# Patient Record
Sex: MALE | Race: WHITE | Employment: OTHER | URBAN - METROPOLITAN AREA
[De-identification: names, ages, dates, MRNs, and addresses within clinical notes are randomized per-mention and may not be internally consistent; named-entity substitution may affect disease eponyms.]

---

## 2022-11-15 ENCOUNTER — HOSPITAL ENCOUNTER (OUTPATIENT)
Age: 67
Discharge: HOME | End: 2022-11-17
Payer: OTHER GOVERNMENT

## 2022-11-15 ENCOUNTER — HOSPITAL ENCOUNTER (OUTPATIENT)
Dept: GENERAL RADIOLOGY | Age: 67
Discharge: HOME | End: 2022-11-17
Payer: OTHER GOVERNMENT

## 2022-11-15 ENCOUNTER — HOSPITAL ENCOUNTER (OUTPATIENT)
Dept: PULMONOLOGY | Age: 67
Discharge: HOME | End: 2022-11-15
Payer: OTHER GOVERNMENT

## 2022-11-15 DIAGNOSIS — J61 AMIANTHOSIS (HCC): ICD-10-CM

## 2022-11-15 DIAGNOSIS — J61 ASBESTOSIS (HCC): ICD-10-CM

## 2022-11-15 PROCEDURE — 71046 X-RAY EXAM CHEST 2 VIEWS: CPT

## 2022-11-15 PROCEDURE — 94726 PLETHYSMOGRAPHY LUNG VOLUMES: CPT

## 2022-11-15 PROCEDURE — 94060 EVALUATION OF WHEEZING: CPT

## 2022-11-15 PROCEDURE — 94729 DIFFUSING CAPACITY: CPT

## 2022-11-19 NOTE — PROCEDURES
[address] [place] [address]                               PULMONARY FUNCTION    PATIENT NAME: Serena Anne                    :        1955  MED REC NO:   [id number]                            ROOM:  ACCOUNT NO:   [de-identified]                           ADMIT DATE: 11/15/2022  PROVIDER:     Johnna Lane DO    DATE OF PROCEDURE:  11/15/2022    This is a pulmonary function interpretation by Dr. Elisha Buenrostro,  requested by Dr. Raj Mesa. INTERPRETATION OF THE PULMONARY FUNCTION STUDY    DEMOGRAPHICS:  This is a 73-year-old male and the height is 5 feet 6  inches (66 inches). His age is 69. He is  male and with his  weight of 185 pounds his BMI is 29.9, so he is borderline mildly obese. REASON FOR THE PULMONARY FUNCTION STUDY:  It is to evaluate the patient  with regards to his pulmonary symptoms and his major symptom is  exertional dyspnea. So, I would say that his pulmonary symptoms  primarily would be his exertional dyspnea. QUESTION:  Has he ever had a pulmonary function study? ANSWER:  No.    QUESTION AND ANSWER:  Has he ever had any allergies to medications and  to process this, I would say that there is none on file, so no known  drug allergies to our knowledge. CURRENT MEDICATIONS:  That would be appreciated as a high blood pressure  pill. He does not describe the pill that he is taking for his blood  pressure. QUESTION:  Was he in a chronic stable state when performing the study? ANSWER:  He states he did not have any community respiratory infections,  which would influence the interpretation of this study today. PULMONARY SYMPTOMS:  He says he does not have a cough. He does not  really notice any coughing in his history, either acutely or  chronically. I would like to state that also suggests that he does not  have any hemoptysis.   He does state that he does wheeze and he notices  his shortness of breath is primarily with activities, but also at rest  as well, so \"it is all the time. \"  So we can appreciate this as being  remarkable. CONSTITUTIONAL SYMPTOMS:  He has not really stated that he has lost any  excessive weight or noticed any fevers or night sweats. So that would  be important. SMOKING HISTORY:  He states that he smoked for 30 years, but only smoked  somewhere around a half-a-pack a day. ATOPIC HISTORY:  He denies any sensitivities. So he would be nonatopic  by history. PET EXPOSURE:  He denies any pet exposure. ENVIRONMENTAL FACTORS:  He says that he does appear to notice more  shortness of breath with the heat, the cold air or the humid air. 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 So, that would be considered asbestos-related  pleural disease. REVIEW OF SYSTEMS:  I do not appreciate any review other than for his  hypertension on a blood pressure medication. SLEEP:  I see that he does have two-pillow need at night. FAMILY HISTORY:  Did not include any family history in his subjective  history here today  and he has had no exposure to tuberculosis that we are aware of. PULMONARY FUNCTION STUDY:  SPIROMETRY:  On his forced vital capacity maneuver, he should get 3.6 L  out on the forced expiratory maneuver with his vital capacity and he  only had 2.8 L, 78%. A little bit better after the bronchodilator to 3  L and 80% of predicted. So there is some mild restrictive mechanics. Now in the 1 second of the 3.6 L, he should have 2.8 L out in 1 second. He only had 1.1 L, 40% of predicted and then he improved about 200 mL  after bronchodilator 1.37 L, 48% of predicted. So this suggests rather  severe flow impairment that is largely irreversible. There is a partial  reversible component, but largely irreversible.   The FEV1 to FVC ratio  postbronchodilator at 46% suggests that he does have airflow obstructive  disease and based on his FEV1 postbronchodilator of 1.3 L, 48% of  predicted, this is considered to be severe airflow obstructive disease  and with his smoking history this is consistent with the diagnosis of  COPD, especially if it is progressive over time. MECHANICS OF BREATHING:  We see that there is definite influence on his  diaphragmatic strength, his expiratory muscle strength, and his maximum  voluntary ventilation related to his underlying flow impairment and his  COPD. So, based on his MVV of only 37 L per minute, this would suggest  that anything that he does of a mild or sedentary nature will result in  breathlessness and it has influenced his mechanics of breathing probably  with mechanical impairment associated with his COPD. STATIC LUNG VOLUMES:  We could see that there is air trapping with his  RV to TLC being twice normal.  His total lung capacity is at 8 L and  suggesting hyperinflation. So we can see that in addition to the  hyperinflation and air trapping, we see that his inspiratory capacity is  reduced as well as a consequence of his hyperinflated lungs. Gas  transfer is reduced as well and this would suggest that he probably has  a component of centrilobular emphysema associated with his COPD. Now  with small airway changes, we could see that there is airway dystonia  with his airway resistance being markedly increased as his conductance  decreased. FLOW VOLUME CURVE:  He has an obstructive pattern and there is a subtle  improvement after bronchodilator and with less air trapping noted.     IMPRESSION:  The patient's pulmonary function demonstrates evidence that  his former smoking history has predisposed him to a pulmonary  complication that would be consistent with COPD GOLD III, which is  severe pulmonary functional impairment and there is a phenotype that is  suggestive of centrilobular emphysema with hyperinflation and air  trapping and severe gas transfer impairment and this is important. I do  find that he has pleural plaques suggesting asbestos-related pleural  changes from his asbestos exposure. I do not see a lot of changes that  would suggest that his gas transfer impairment is related to scarring in  the bases of the lungs. Chest x-ray does not appreciate that. Better  definition was chest CT may define that better. RECOMMENDATIONS:  With regards to the patient, it would be important in regards to  recommendations:  1. The patient would benefit from combination LAMA/LABA (long-acting  muscarinic antagonist and long-acting beta agonist) in combination would  be helpful in improving control of his hyperinflated lungs. If he does  have exacerbations, then we can go to triple therapy. 2.  It is important for the patient to accomplish smoking cessation  using a nicotine aid to control withdrawal symptoms. 3.  Immunization and lifestyle modifications to a healthy one and that  would be excluding any inhalational dust exposure that may aggravate his  pulmonary disposition. So, overall the patient with his smoking history  and his age and his heightened exposure to asbestos would benefit from  low-dose chest CT scans on a yearly basis as he is at high risk for the  development of lung cancer.         Gavi Tapia DO    D: 11/18/2022 11:31:36       T: 11/18/2022 11:36:25     [email or web address]  Job#: [id number]     Doc#: [id number]    CC: